# Patient Record
Sex: MALE | Race: WHITE | NOT HISPANIC OR LATINO | URBAN - NONMETROPOLITAN AREA
[De-identification: names, ages, dates, MRNs, and addresses within clinical notes are randomized per-mention and may not be internally consistent; named-entity substitution may affect disease eponyms.]

---

## 2017-12-01 ENCOUNTER — NEW PATIENT (OUTPATIENT)
Dept: URBAN - NONMETROPOLITAN AREA CLINIC 7 | Facility: CLINIC | Age: 77
End: 2017-12-01
Payer: MEDICARE

## 2017-12-01 DIAGNOSIS — H52.4 PRESBYOPIA: ICD-10-CM

## 2017-12-01 PROCEDURE — 92004 COMPRE OPH EXAM NEW PT 1/>: CPT | Performed by: OPTOMETRIST

## 2017-12-01 PROCEDURE — 92134 CPTRZ OPH DX IMG PST SGM RTA: CPT | Performed by: OPTOMETRIST

## 2017-12-01 PROCEDURE — 92015 DETERMINE REFRACTIVE STATE: CPT | Performed by: OPTOMETRIST

## 2017-12-01 ASSESSMENT — INTRAOCULAR PRESSURE
OD: 12
OS: 11

## 2017-12-01 ASSESSMENT — VISUAL ACUITY
OD: 20/20
OS: 20/20

## 2018-12-07 ENCOUNTER — FOLLOW UP ESTABLISHED (OUTPATIENT)
Dept: URBAN - NONMETROPOLITAN AREA CLINIC 7 | Facility: CLINIC | Age: 78
End: 2018-12-07
Payer: COMMERCIAL

## 2018-12-07 DIAGNOSIS — Z96.1 PRESENCE OF INTRAOCULAR LENS: ICD-10-CM

## 2018-12-07 PROCEDURE — 92014 COMPRE OPH EXAM EST PT 1/>: CPT | Performed by: OPTOMETRIST

## 2018-12-07 PROCEDURE — 92134 CPTRZ OPH DX IMG PST SGM RTA: CPT | Performed by: OPTOMETRIST

## 2018-12-07 ASSESSMENT — INTRAOCULAR PRESSURE
OD: 14
OS: 12

## 2019-08-16 ENCOUNTER — FOLLOW UP ESTABLISHED (OUTPATIENT)
Dept: URBAN - NONMETROPOLITAN AREA CLINIC 7 | Facility: CLINIC | Age: 79
End: 2019-08-16
Payer: COMMERCIAL

## 2019-08-16 PROCEDURE — 99213 OFFICE O/P EST LOW 20 MIN: CPT | Performed by: OPTOMETRIST

## 2019-08-16 PROCEDURE — 92134 CPTRZ OPH DX IMG PST SGM RTA: CPT | Performed by: OPTOMETRIST

## 2019-08-16 ASSESSMENT — INTRAOCULAR PRESSURE
OD: 16
OS: 16

## 2019-12-13 ENCOUNTER — FOLLOW UP ESTABLISHED (OUTPATIENT)
Dept: URBAN - NONMETROPOLITAN AREA CLINIC 7 | Facility: CLINIC | Age: 79
End: 2019-12-13
Payer: COMMERCIAL

## 2019-12-13 DIAGNOSIS — H04.123 TEAR FILM INSUFFICIENCY OF BILATERAL LACRIMAL GLANDS: ICD-10-CM

## 2019-12-13 DIAGNOSIS — H17.12 CENTRAL CORNEAL OPACITY OF LEFT EYE: ICD-10-CM

## 2019-12-13 DIAGNOSIS — H43.813 VITREOUS DEGENERATION, BILATERAL: ICD-10-CM

## 2019-12-13 PROCEDURE — 92134 CPTRZ OPH DX IMG PST SGM RTA: CPT | Performed by: OPTOMETRIST

## 2019-12-13 PROCEDURE — 92014 COMPRE OPH EXAM EST PT 1/>: CPT | Performed by: OPTOMETRIST

## 2019-12-13 ASSESSMENT — VISUAL ACUITY
OS: 20/20
OD: 20/20

## 2019-12-13 ASSESSMENT — INTRAOCULAR PRESSURE
OS: 11
OD: 12

## 2020-12-16 ENCOUNTER — FOLLOW UP ESTABLISHED (OUTPATIENT)
Dept: URBAN - NONMETROPOLITAN AREA CLINIC 7 | Facility: CLINIC | Age: 80
End: 2020-12-16
Payer: COMMERCIAL

## 2020-12-16 DIAGNOSIS — H31.091 OTHER CHORIORETINAL SCARS, RIGHT EYE: ICD-10-CM

## 2020-12-16 PROCEDURE — 92134 CPTRZ OPH DX IMG PST SGM RTA: CPT | Performed by: OPTOMETRIST

## 2020-12-16 PROCEDURE — 92014 COMPRE OPH EXAM EST PT 1/>: CPT | Performed by: OPTOMETRIST

## 2020-12-16 ASSESSMENT — INTRAOCULAR PRESSURE
OS: 8
OD: 9

## 2021-12-15 ENCOUNTER — OFFICE VISIT (OUTPATIENT)
Dept: URBAN - NONMETROPOLITAN AREA CLINIC 7 | Facility: CLINIC | Age: 81
End: 2021-12-15
Payer: COMMERCIAL

## 2021-12-15 DIAGNOSIS — H35.373 PUCKERING OF MACULA, BILATERAL: Primary | ICD-10-CM

## 2021-12-15 PROCEDURE — 92134 CPTRZ OPH DX IMG PST SGM RTA: CPT | Performed by: OPTOMETRIST

## 2021-12-15 PROCEDURE — 92014 COMPRE OPH EXAM EST PT 1/>: CPT | Performed by: OPTOMETRIST

## 2021-12-15 ASSESSMENT — INTRAOCULAR PRESSURE
OS: 10
OD: 10

## 2021-12-15 NOTE — IMPRESSION/PLAN
Impression: Chorioretinal scars, right eye * S/P retinal tear w/ Tx Plan: Currently, the condition appears to be stable. Continue to monitor.

## 2021-12-15 NOTE — IMPRESSION/PLAN
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 Continue to monitor.

## 2021-12-15 NOTE — IMPRESSION/PLAN
Impression: Puckering of macula, bilateral Plan: Currently, the condition appears to be stable. Continue to monitor. Mac OCT ordered today to monitor for progressive changes.

## 2021-12-15 NOTE — IMPRESSION/PLAN
Impression: Tear film insufficiency of bilateral lacrimal glands Plan: Artificial tears, a minimum of BID and more as needed.